# Patient Record
Sex: FEMALE | Race: WHITE | Employment: UNEMPLOYED | URBAN - METROPOLITAN AREA
[De-identification: names, ages, dates, MRNs, and addresses within clinical notes are randomized per-mention and may not be internally consistent; named-entity substitution may affect disease eponyms.]

---

## 2016-10-10 LAB
ANTIBODY SCREEN, EXTERNAL: NEGATIVE
CHLAMYDIA, EXTERNAL: NEGATIVE
GRBS, EXTERNAL: POSITIVE
HBSAG, EXTERNAL: NEGATIVE
HEPATITIS C AB,   EXT: NEGATIVE
HIV, EXTERNAL: NEGATIVE
N. GONORRHEA, EXTERNAL: NEGATIVE
RPR, EXTERNAL: NON REACTIVE
RUBELLA, EXTERNAL: NORMAL
TYPE, ABO & RH, EXTERNAL: NORMAL

## 2017-04-07 LAB — GRBS, EXTERNAL: POSITIVE

## 2017-05-01 ENCOUNTER — HOSPITAL ENCOUNTER (INPATIENT)
Age: 42
LOS: 1 days | Discharge: HOME | End: 2017-05-02
Attending: OBSTETRICS & GYNECOLOGY | Admitting: OBSTETRICS & GYNECOLOGY
Payer: SELF-PAY

## 2017-05-01 PROBLEM — O09.523 ELDERLY MULTIGRAVIDA IN THIRD TRIMESTER: Status: ACTIVE | Noted: 2017-05-01

## 2017-05-01 PROBLEM — K90.0 CELIAC DISEASE: Status: ACTIVE | Noted: 2017-05-01

## 2017-05-01 PROBLEM — E03.9 ACQUIRED HYPOTHYROIDISM: Status: ACTIVE | Noted: 2017-05-01

## 2017-05-01 LAB
ABO + RH BLD: NORMAL
BASOPHILS # BLD AUTO: 0 K/UL (ref 0–0.06)
BASOPHILS # BLD: 0 % (ref 0–2)
BLOOD GROUP ANTIBODIES SERPL: NORMAL
DIFFERENTIAL METHOD BLD: NORMAL
EOSINOPHIL # BLD: 0.1 K/UL (ref 0–0.4)
EOSINOPHIL NFR BLD: 1 % (ref 0–5)
ERYTHROCYTE [DISTWIDTH] IN BLOOD BY AUTOMATED COUNT: 13.7 % (ref 11.6–14.5)
HCT VFR BLD AUTO: 38.8 % (ref 35–45)
HGB BLD-MCNC: 13 G/DL (ref 12–16)
LYMPHOCYTES # BLD AUTO: 24 % (ref 21–52)
LYMPHOCYTES # BLD: 1.5 K/UL (ref 0.9–3.6)
MCH RBC QN AUTO: 30.8 PG (ref 24–34)
MCHC RBC AUTO-ENTMCNC: 33.5 G/DL (ref 31–37)
MCV RBC AUTO: 91.9 FL (ref 74–97)
MONOCYTES # BLD: 0.5 K/UL (ref 0.05–1.2)
MONOCYTES NFR BLD AUTO: 7 % (ref 3–10)
NEUTS SEG # BLD: 4.4 K/UL (ref 1.8–8)
NEUTS SEG NFR BLD AUTO: 68 % (ref 40–73)
PLATELET # BLD AUTO: 181 K/UL (ref 135–420)
PMV BLD AUTO: 11.2 FL (ref 9.2–11.8)
RBC # BLD AUTO: 4.22 M/UL (ref 4.2–5.3)
SPECIMEN EXP DATE BLD: NORMAL
WBC # BLD AUTO: 6.4 K/UL (ref 4.6–13.2)

## 2017-05-01 PROCEDURE — 74011250636 HC RX REV CODE- 250/636: Performed by: ADVANCED PRACTICE MIDWIFE

## 2017-05-01 PROCEDURE — 75410000014 HC MIDWIFREY DEL SVC

## 2017-05-01 PROCEDURE — 75410000000 HC DELIVERY VAGINAL/SINGLE

## 2017-05-01 PROCEDURE — 74011000258 HC RX REV CODE- 258: Performed by: ADVANCED PRACTICE MIDWIFE

## 2017-05-01 PROCEDURE — 86900 BLOOD TYPING SEROLOGIC ABO: CPT | Performed by: ADVANCED PRACTICE MIDWIFE

## 2017-05-01 PROCEDURE — 85025 COMPLETE CBC W/AUTO DIFF WBC: CPT | Performed by: ADVANCED PRACTICE MIDWIFE

## 2017-05-01 PROCEDURE — 75410000002 HC LABOR FEE PER 1 HR

## 2017-05-01 PROCEDURE — 65270000029 HC RM PRIVATE

## 2017-05-01 PROCEDURE — 75410000003 HC RECOV DEL/VAG/CSECN EA 0.5 HR

## 2017-05-01 PROCEDURE — 74011250637 HC RX REV CODE- 250/637: Performed by: ADVANCED PRACTICE MIDWIFE

## 2017-05-01 PROCEDURE — 77030020255 HC SOL INJ LR 1000ML BG

## 2017-05-01 RX ORDER — TERBUTALINE SULFATE 1 MG/ML
0.25 INJECTION SUBCUTANEOUS
Status: DISCONTINUED | OUTPATIENT
Start: 2017-05-01 | End: 2017-05-01 | Stop reason: HOSPADM

## 2017-05-01 RX ORDER — CITALOPRAM 20 MG/1
20 TABLET, FILM COATED ORAL DAILY
Status: CANCELLED | OUTPATIENT
Start: 2017-05-01

## 2017-05-01 RX ORDER — SENNOSIDES 8.6 MG/1
2 TABLET ORAL
Status: DISCONTINUED | OUTPATIENT
Start: 2017-05-01 | End: 2017-05-02 | Stop reason: HOSPADM

## 2017-05-01 RX ORDER — CITALOPRAM 20 MG/1
20 TABLET, FILM COATED ORAL DAILY
COMMUNITY

## 2017-05-01 RX ORDER — MISOPROSTOL 200 UG/1
200 TABLET ORAL
Status: COMPLETED | OUTPATIENT
Start: 2017-05-01 | End: 2017-05-01

## 2017-05-01 RX ORDER — MISOPROSTOL 200 UG/1
800 TABLET ORAL ONCE
Status: DISCONTINUED | OUTPATIENT
Start: 2017-05-01 | End: 2017-05-01 | Stop reason: HOSPADM

## 2017-05-01 RX ORDER — OXYTOCIN/RINGER'S LACTATE 20/1000 ML
1000 PLASTIC BAG, INJECTION (ML) INTRAVENOUS
Status: DISCONTINUED | OUTPATIENT
Start: 2017-05-01 | End: 2017-05-02 | Stop reason: HOSPADM

## 2017-05-01 RX ORDER — ACETAMINOPHEN 325 MG/1
325-650 TABLET ORAL
Status: DISCONTINUED | OUTPATIENT
Start: 2017-05-01 | End: 2017-05-02 | Stop reason: HOSPADM

## 2017-05-01 RX ORDER — OXYTOCIN/RINGER'S LACTATE 20/1000 ML
125 PLASTIC BAG, INJECTION (ML) INTRAVENOUS
Status: DISCONTINUED | OUTPATIENT
Start: 2017-05-01 | End: 2017-05-02 | Stop reason: HOSPADM

## 2017-05-01 RX ORDER — IBUPROFEN 600 MG/1
600 TABLET ORAL
Status: DISCONTINUED | OUTPATIENT
Start: 2017-05-01 | End: 2017-05-02 | Stop reason: HOSPADM

## 2017-05-01 RX ORDER — BUTORPHANOL TARTRATE 1 MG/ML
2 INJECTION INTRAMUSCULAR; INTRAVENOUS
Status: DISCONTINUED | OUTPATIENT
Start: 2017-05-01 | End: 2017-05-01 | Stop reason: HOSPADM

## 2017-05-01 RX ORDER — OXYTOCIN/RINGER'S LACTATE 20/1000 ML
PLASTIC BAG, INJECTION (ML) INTRAVENOUS
Status: DISPENSED
Start: 2017-05-01 | End: 2017-05-02

## 2017-05-01 RX ORDER — ACETAMINOPHEN 500 MG
TABLET ORAL 2 TIMES DAILY
COMMUNITY

## 2017-05-01 RX ORDER — MISOPROSTOL 200 UG/1
800 TABLET ORAL
Status: COMPLETED | OUTPATIENT
Start: 2017-05-01 | End: 2017-05-01

## 2017-05-01 RX ORDER — LIDOCAINE HYDROCHLORIDE 10 MG/ML
20 INJECTION, SOLUTION EPIDURAL; INFILTRATION; INTRACAUDAL; PERINEURAL AS NEEDED
Status: DISCONTINUED | OUTPATIENT
Start: 2017-05-01 | End: 2017-05-01 | Stop reason: HOSPADM

## 2017-05-01 RX ORDER — CASTOR OIL 100 %
30 OIL (ML) ORAL ONCE
Status: DISCONTINUED | OUTPATIENT
Start: 2017-05-01 | End: 2017-05-01 | Stop reason: HOSPADM

## 2017-05-01 RX ORDER — OXYTOCIN/RINGER'S LACTATE 20/1000 ML
125 PLASTIC BAG, INJECTION (ML) INTRAVENOUS CONTINUOUS
Status: DISCONTINUED | OUTPATIENT
Start: 2017-05-01 | End: 2017-05-01 | Stop reason: HOSPADM

## 2017-05-01 RX ORDER — OXYTOCIN 10 [USP'U]/ML
10 INJECTION, SOLUTION INTRAMUSCULAR; INTRAVENOUS
Status: DISCONTINUED | OUTPATIENT
Start: 2017-05-01 | End: 2017-05-02 | Stop reason: HOSPADM

## 2017-05-01 RX ORDER — LEVOTHYROXINE AND LIOTHYRONINE 19; 4.5 UG/1; UG/1
65 TABLET ORAL DAILY
Status: CANCELLED | OUTPATIENT
Start: 2017-05-01

## 2017-05-01 RX ORDER — OXYTOCIN/RINGER'S LACTATE 20/1000 ML
500 PLASTIC BAG, INJECTION (ML) INTRAVENOUS ONCE
Status: DISCONTINUED | OUTPATIENT
Start: 2017-05-01 | End: 2017-05-01 | Stop reason: HOSPADM

## 2017-05-01 RX ORDER — SODIUM CHLORIDE, SODIUM LACTATE, POTASSIUM CHLORIDE, CALCIUM CHLORIDE 600; 310; 30; 20 MG/100ML; MG/100ML; MG/100ML; MG/100ML
125 INJECTION, SOLUTION INTRAVENOUS CONTINUOUS
Status: DISCONTINUED | OUTPATIENT
Start: 2017-05-01 | End: 2017-05-01 | Stop reason: HOSPADM

## 2017-05-01 RX ORDER — HYDROCORTISONE 10 MG/G
CREAM TOPICAL AS NEEDED
Status: DISCONTINUED | OUTPATIENT
Start: 2017-05-01 | End: 2017-05-02 | Stop reason: HOSPADM

## 2017-05-01 RX ORDER — NALBUPHINE HYDROCHLORIDE 10 MG/ML
10 INJECTION, SOLUTION INTRAMUSCULAR; INTRAVENOUS; SUBCUTANEOUS
Status: DISCONTINUED | OUTPATIENT
Start: 2017-05-01 | End: 2017-05-01 | Stop reason: HOSPADM

## 2017-05-01 RX ORDER — HYDROMORPHONE HYDROCHLORIDE 1 MG/ML
1 INJECTION, SOLUTION INTRAMUSCULAR; INTRAVENOUS; SUBCUTANEOUS
Status: DISCONTINUED | OUTPATIENT
Start: 2017-05-01 | End: 2017-05-01 | Stop reason: HOSPADM

## 2017-05-01 RX ORDER — METHYLERGONOVINE MALEATE 0.2 MG/ML
0.2 INJECTION INTRAVENOUS AS NEEDED
Status: DISCONTINUED | OUTPATIENT
Start: 2017-05-01 | End: 2017-05-01 | Stop reason: HOSPADM

## 2017-05-01 RX ADMIN — MISOPROSTOL 800 MCG: 200 TABLET ORAL at 18:00

## 2017-05-01 RX ADMIN — MISOPROSTOL 200 MCG: 200 TABLET ORAL at 13:23

## 2017-05-01 RX ADMIN — Medication 125 ML/HR: at 19:09

## 2017-05-01 RX ADMIN — SODIUM CHLORIDE 5 MILLION UNITS: 900 INJECTION INTRAVENOUS at 05:58

## 2017-05-01 RX ADMIN — PENICILLIN G POTASSIUM 2.5 MILLION UNITS: 20000000 POWDER, FOR SOLUTION INTRAVENOUS at 10:18

## 2017-05-01 RX ADMIN — Medication 1000 ML/HR: at 18:00

## 2017-05-01 RX ADMIN — SODIUM CHLORIDE, SODIUM LACTATE, POTASSIUM CHLORIDE, AND CALCIUM CHLORIDE 125 ML/HR: 600; 310; 30; 20 INJECTION, SOLUTION INTRAVENOUS at 05:58

## 2017-05-01 RX ADMIN — PENICILLIN G POTASSIUM 2.5 MILLION UNITS: 20000000 POWDER, FOR SOLUTION INTRAVENOUS at 14:14

## 2017-05-01 NOTE — L&D DELIVERY NOTE
Delivery Summary    Patient: Andre Lopez MRN: [id number]  SSN: [id number]    YOB: 1975  Age: 39 y.o. Sex: female        Labor Events:    Labor: No    Rupture Date: 2017    Rupture Time: 4:00 AM    Rupture Type      Amniotic Fluid Volume: Moderate     Amniotic Fluid Description: Clear  None    Induction:           Augmentation:      Labor Complications: None     Additional Complications:        Cervical Ripenin2017  1:23 PM  Misoprostol      Delivery Events:  Episiotomy: None    Laceration(s): None       Repaired: None     Number of Repair Packets:      Suture Type and Size: None        Estimated Blood Loss (ml): 400      Karen moved from 4 to 5 cms very quickly and requested [address]. Transferred to  [id number]. Spontaneous urge to push and requested standing birth. Rapid descent for a controlled spontaneous vaginal delivery of a viable male infant with a snug double nuchal cord sommersaulted to birth and cord reduced at level of Karen's mons. Tactile stimulation produced a cry but tone remained poor and cry did not continue. Cord clamped for cut by dad and baby to the warmer for assistance. Dr Fer Fairchild in just after one minute apgar. Gases collected by RN: unable to obtain arterial sample. Venous collected. Dr Monroe Age aware. Cord blood collected. Karen to the foot of the bed for birth of placenta. Placenta delivered spontaneously and intact via Schultze mechanism. Pitocin started via IV. Heavy bleeding noted and 800 mcg of cytotec added per rectum along with continued fundal massage. Bleeding stable. Perineum and vagina intact to inspection. Lacey Broderick assisted to the head of the bed. Baby to her arms. Dyad skin to skin in the birthing room with family gathered around. Dr Mukund Solis notified of the birth.   Sebas Lubin CNM    Information for the patient's :  Epifanio Samano [[id number]]     Delivery Summary - Baby    Delivery Date: 2017   Delivery Time: 5:50 PM   Delivery Type: Vaginal, Spontaneous Delivery  Sex:  male  Gestational Age: 43w3d  Delivery Clinician:  Merlene [facility]?: Yes   Delivery Location: [place] [address]  One minute Five minutes Ten minutes   Skin Color: 0    1       Heart Rate: 2   2         Reflex Irritability: 2   2         Muscle Tone: 0   2       Respiration: 1   2         Total: 5   9           Presentation: Vertex  Position: Middle Occiput Anterior  Resuscitation Method:        Meconium Stained:      Cord Information: 3 Vessels   Complications: Nuchal Cord Without Compressions  Cord Blood Sent?:  Yes    Blood Gases Sent?:  Yes    Placenta:  Date/Time:   5:56 PM  Removal: Spontaneous      Appearance:        Measurements:  Birth Weight:      Birth Length:     Head Circumference:       Chest Circumference:      Abdominal Girth:       Other Providers:   CHANO Seals Cleve Banks, MARK Brandon Gores D Primary Nurse;Primary  Nurse;Midwife;Pediatrician;Staff Nurse;Staff Nurse           Cord Blood Results:  Information for the patient's :  Ramone Wilkerson [[id number]]   No results found for: ABORH, PCTABR, PCTDIG, BILI, ABORHEXT, ABORH    Information for the patient's :  Ramone Wilkerson [[id number]]   No results found for: APH, APCO2, APO2, AHCO3, ABEC, ABDC, O2ST, SITE, RSCOM, PHI, PCO2I, PO2I, HCO3I, SO2I, IBD     Information for the patient's :  Ramone Wilkerson [[id number]]   No results found for: EPHV, PCO2V, PO2V, HCO3V, O2STV, EBDV

## 2017-05-01 NOTE — IP AVS SNAPSHOT
Current Discharge Medication List  
  
CONTINUE these medications which have NOT CHANGED Dose & Instructions Dispensing Information Comments Morning Noon Evening Bedtime Cholecalciferol (Vitamin D3) 2,000 unit Cap capsule Commonly known as:  VITAMIN D3 Your last dose was: Your next dose is: Take  by mouth two (2) times a day. Refills:  0  
     
   
   
   
  
 citalopram 20 mg tablet Commonly known as:  Claudeen Allegra Your last dose was: Your next dose is:    
   
   
 Dose:  20 mg Take 20 mg by mouth daily. Refills:  0 IRON PO Your last dose was: Your next dose is: Take  by mouth. Refills:  0 MAGNESIUM PO Your last dose was: Your next dose is: Take  by mouth. Refills:  0 PRENATAL PO Your last dose was: Your next dose is: Take  by mouth. Refills:  0  
     
   
   
   
  
 thyroid (Pork) 32.5 mg tablet Commonly known as:  NATURE-THROID/WESTHROID Your last dose was: Your next dose is:    
   
   
 Dose:  65 mg Take 65 mg by mouth daily. Refills:  0 STOP taking these medications   
 sertraline 100 mg tablet Commonly known as:  ZOLOFT  
   
  
  
ASK your doctor about these medications Dose & Instructions Dispensing Information Comments Morning Noon Evening Bedtime OTHER(NON-FORMULARY) Your last dose was: Your next dose is:    
   
   
  Refills:  0 POTASSIUM PO Your last dose was: Your next dose is: Take  by mouth. Refills:  0

## 2017-05-01 NOTE — PROGRESS NOTES
Labor Progress Note  Douglass Litten is still noting fatigue. She prefers to proceed with cervical ripening. We have discussed oral cytotec and discuss it again. She would like to proceed now rather than await spontaneous labor. SVE deferred as is not toña. PROM around 4 am today. Pelvic exam:       Cervical Exam  Dilation (cm):  (1)  Eff:  (40)  Station: Floating  Presentation: Cephalic (verified by ultrasound)  FHR Interpretation: Overall pattern reassuring  FHTs reactive from 135  UCs occasional and mild    Visit Vitals    /72    Pulse 83    Temp 98.2 °F (36.8 °C)    Resp 18    Ht 5' 1\" (1.549 m)    Wt 74.8 kg (165 lb)    SpO2 92%    Breastfeeding Yes    BMI 31.18 kg/m2       Temp (24hrs), Av °F (36.7 °C), Min:97.8 °F (36.6 °C), Max:98.2 °F (36.8 °C)      Recent Labs      17   0552   WBC  6.4   HGB  13.0           Assessment/Plan:  Orders as written. Dr Bay Cords here and updated.      Titus Bill CNM  2017  12:53 PM

## 2017-05-01 NOTE — PROGRESS NOTES
Labor Progress Note  Yumiko Herrera is huhgry, a bit fatigued and very comfortable. Her  and family members are at her bedside. Georges Rucker ruptured just after 4 and has only a few contractions with she describes as no different than the ones she had yesterday. We discussed her options for the day. We discussed the nonreactive FHTs. She ate yesterday at noon, not much last night and nothing this morning. We will begin with a breakfast tray. Baby is high so I encourage monitoring until baby is well applied to the cervix. She agrees. Waiting, SOL, oral cytotec all discussed as options. Medicated vs unmedicated birth options also discussed with no pressure to know at this time what she will prefer. [address] if 5 cms/0 station and wanting unmedicated birth. Expectant management for now. Discussed antibiotic prophylaxis for GBS. Georges Rucker would like ONLY two doses. Discussed the protocol, risks and benefits and ok the use of her lactinex so she is feeling better about the plan for prophylaxis. Pelvic exam:       Cervical Exam  Dilation (cm):  (1)  Eff:  (40)  Station: Floating  Presentation: Cephalic (verified by ultrasound)  FHR Interpretation: Overall pattern reassuring  Category 1  No labor contractions at this time. Visit Vitals    /67    Pulse 80    Temp 97.8 °F (36.6 °C)    Resp 18    Ht 5' 1\" (1.549 m)    Wt 74.8 kg (165 lb)    SpO2 92%    Breastfeeding Yes    BMI 31.18 kg/m2       Temp (24hrs), Av °F (36.7 °C), Min:97.8 °F (36.6 °C), Max:98.2 °F (36.8 °C)      Recent Labs      17   0552   WBC  6.4   HGB  13.0           Assessment: Grand multip with PROM, GBS pos. Presenting part very high. Plan: Breakfast tray now. Antibiotic prophylaxis to continue. Expectant management with continuous EFM for now.    Mary Diaz CNM  2017  9:58 AM

## 2017-05-01 NOTE — PROGRESS NOTES
Patient transferred via wheelchair from labor and delivery to midwifery center for delivery with family following, carrying belongings. Patient tolerated transfer well.  Fetal heart tones obtained upon arrival.

## 2017-05-01 NOTE — IP AVS SNAPSHOT
Beatris Malloy 
 
 
 [address] 
[phone] Patient: Yola Mccoy MRN: [id number] You are allergic to the following Allergen Reactions Barley Other (comments) Gluten Other (comments) Celiac disease with multiple system reactions Rye Grass Other (comments) Wheat Other (comments) Recent Documentation Height Weight Breastfeeding? BMI OB Status Smoking Status 1.549 m 74.8 kg Yes 31.18 kg/m2 Recent pregnancy Never Smoker Emergency Contacts Name Discharge Info Relation Home Work Mobile Irwin Herrera  Spouse [3] [phone] About your hospitalization You were admitted on:  May 1, 2017 You last received care in the:  [facility] You were discharged on:  May 2, 2017 Unit phone number:  [phone] Why you were hospitalized Your primary diagnosis was:  Postpartum Care Following Vaginal Delivery Your diagnoses also included:  Labor And Delivery Indication For Care Or Intervention, Elderly Multigravida In Third Trimester, Depression, Acquired Hypothyroidism, [address] Multiparity With Current Pregnancy In Labor And Delivery In Third Trimester, Celiac Disease Providers Seen During Your Hospitalizations Provider Role Specialty Primary office phone Pamela Cotter MD Attending Provider Obstetrics & Gynecology [phone] Your Primary Care Physician (PCP) Primary Care Physician Office Phone Office Fax Mariya Hernandez [phone] Follow-up Information Follow up With Details Comments Contact Info Jericho Nails MD Schedule an appointment as soon as possible for a visit to arrange for thyroid evaluation [address]akila Ortiz [address] [id number] [phone] [address] , [facility] In 6 weeks  [address] [facility], [address] [place] [address]) [phone] Current Discharge Medication List  
  
CONTINUE these medications which have NOT CHANGED Dose & Instructions Dispensing Information Comments Morning Noon Evening Bedtime Cholecalciferol (Vitamin D3) 2,000 unit Cap capsule Commonly known as:  VITAMIN D3 Your last dose was: Your next dose is: Take  by mouth two (2) times a day. Refills:  0  
     
   
   
   
  
 citalopram 20 mg tablet Commonly known as:  Abhishek Saul Your last dose was: Your next dose is:    
   
   
 Dose:  20 mg Take 20 mg by mouth daily. Refills:  0 IRON PO Your last dose was: Your next dose is: Take  by mouth. Refills:  0 MAGNESIUM PO Your last dose was: Your next dose is: Take  by mouth. Refills:  0 PRENATAL PO Your last dose was: Your next dose is: Take  by mouth. Refills:  0  
     
   
   
   
  
 thyroid (Pork) 32.5 mg tablet Commonly known as:  NATURE-THROID/WESTHROID Your last dose was: Your next dose is:    
   
   
 Dose:  65 mg Take 65 mg by mouth daily. Refills:  0 STOP taking these medications   
 sertraline 100 mg tablet Commonly known as:  ZOLOFT  
   
  
  
ASK your doctor about these medications Dose & Instructions Dispensing Information Comments Morning Noon Evening Bedtime OTHER(NON-FORMULARY) Your last dose was: Your next dose is:    
   
   
  Refills:  0 POTASSIUM PO Your last dose was: Your next dose is: Take  by mouth. Refills:  0 Discharge Instructions CONGRATULATIONS ON THE BIRTH OF YOUR BABY!  
The first six weeks after childbirth is a time of physical and emotional adjustment. This handout will help to answer questions and provide guidance during the postpartum period. Every family's adjustment is unique, so please call if you have further concerns. At anytime we can be reached at [phone]. During office hours please ask to speak to a charge nurse. After hours, the answering service will take a message and the Nurse-Midwife on-call will return your call. If your question can wait until office hours: Monday-Friday 8:30-4:00, please do so. For emergencies or urgent concerns do not hesitate to call us after hours. DIET Your body is in need of a well-balanced, high protein diet to recuperate from birth. Please continue to take your prenatal vitamins for 6 weeks or as long as you are breastfeeding. Continue to drink at least 6-8 cups of water or other liquid a day. A breastfeeding mother also needs extra protein, calories and calcium containing foods. It is a good rule to drink fluids with every feeding in order to maintain an adequate milk supply and avoid dehydration. Your baby will probably not be bothered by things in your diet, but if the baby seems extremely fussy or develops a rash, you may want to discuss possible food intolerances with your baby's care provider. PAIN MEDICATIONS Acetaminophen (Tylenol), ibuprofen (Motrin), or other prescribed pain medication may be taken as directed to relieve discomfort. The above medications pass in very minimal amounts into the breast milk and usually will not cause problems. There are medications that may affect the baby, so please consult your baby's care provider before taking medication. If you are breastfeeding, be sure to mention this to any care provider you see so that medications that are safe may be selected. There is an excellent resource called RAMÓN that is a resource for medication safety in pregnancy and lactation.   You can visit their website at [email or web address] or call them toll free at [phone] if you have any questions about medication safety. UTERINE INVOLUTION / VAGINAL BLEEDING Involution is the process of the uterus returning to pre-pregnant size. It will take approximately six weeks for this process to occur. To achieve this size your uterus becomes firm to slow bleeding loss from the placental site. The first 7 days after birth, the bleeding is red and heavy. It may change with your activity and position. Some small clots are normal.   After ten days, the bleeding should be pale pink and slowed considerably. The next several weeks may progress to a pink, mucousy discharge. This may continue for 6-8 weeks, depending on your activity. During the first four weeks after delivery we recommend using sanitary pads instead of tampons. Douching should also be avoided, but it is fine to take a tub bath so long as the tub is very clean. ACTIVITY/EXERCISE Adequate rest is essential to recovery. Try to rest or sleep when the baby sleeps. After two weeks, you may begin going for short walks, doing Kegel exercises and abdominal crunches. Avoid heavy, jarring or aerobic exercises. Remember to start out slowly and build up to your previous fitness level. Use common sense and don't overdo as rest is important and the benefits of increased rest are a quicker recovery. For the first two weeks after a  try to limit trips up or down steps. Do not lift anything heavier than the baby during this time. Lifting the baby or other objects should be done by bending at the knees rather than the waist.  Driving should be avoided during the first two to three weeks until you have the strength to push firmly on the brakes in case of an emergency. You may ride as a passenger, but DO wear a seat belt at all times.   
 
After a few weeks, you may resume normal activity at whatever pace is comfortable for you. Exercise may also be resumed gradually. Walking is a good way to start. Finally, try to be reasonable in your expectations. Caring for a new baby after major surgery can be quite trying. Arrange for assistance at home to ensure that you get enough rest.  
 
POSTPARTUM CHECK You may call the office when you return home to set up a postpartum visit. Most patients will be seen at 6 weeks after delivery, but after a  or other circumstances you may be seen in 2 weeks or less. If you are discharged from the hospital with staples that must be removed, you will be asked to come in sooner. At your postpartum visit, a pelvic exam may be performed. If you are having any problems or concerns, please do not hesitate to call. Once again our number is [phone]. MOOD CHANGES Significant hormonal changes occur in the days following delivery, and as a result, many women experience brief episodes of tearfulness or feeling \"blue. \"  These emotional swings may be made worse by lack of sleep and by the adjustments inherent in becoming a mother. For some women, these fluctuations are minor. For others, they are overwhelming; creating feelings of anxiety, depression, or the inability to cope. If you have difficulty functioning as a result of feeling down, or if the mood changes seem severe, do not improve, or result is thoughts of harming yourself or others CALL RIGHT AWAY. PERINEAL CARE The basic goals of perineal care are to prevent infection, to relieve pain and promote healing. Your stitches will dissolve in four to six weeks, and do not need to be removed. After urinating, please continue to clean with warm water from front to back. Please continue sitz baths as instructed twice a day for a week or as needed. Call the office if you see pus in the suture site, or have unusual or severe swelling or pain that seems to be getting worse.   
 
 
 
 
 
INCISION CARE 
 If you had a , clean and dry the incision gently as you would the rest of your body. Washing over the area with soap and water, and showering are fine. If steri-strips are present they will gradually come off with time. Tub baths are permitted. You may experience numbness and burning in the area surrounding the incision which usually resolves gradually over the next several weeks or months. RETURN OF MENSTRUATION Your first menstrual period may occur as soon as four to six weeks after your delivery if you are not breast-feeding. If breast-feeding it is more difficult to predict when your first period will occur. Even if you are not yet menstruating, you may be ovulating and it may be possible to conceive again. It is common for your first period after childbirth to be very heavy with an increased amount of cramping. BREASTS Breast-feeding Mothers: Colostrum is excreted in the first 24-72 hours. Mature breast milk will appear on the 2nd to 5th day. Engorgement may occur with the mature milk making your breasts feel warm and very full. Frequent feedings will make you more comfortable. Babies do not nurse on regular schedules. Nursing every 1 1/2 to 2 hours is normal and frequent feeding DOES NOT mean you are not making enough milk. To avoid nipple confusion, do not give bottles for the first 4 weeks. Growth spurts are common and may require more frequent feedings. This is the way baby increases your milk supply. During a growth spurt, you may feel you are feeding very frequently and that your breasts are \"empty. \"  Don't worry, your milk is produced by supply and demand so this increased frequency of feeding will increase your milk supply within 48 hours. Sore nipples may occur with frequent feedings and are sometimes also caused by improper latch. Check for a proper latch. Baby should have a wide open mouth. Use different positions at each feeding if possible.   Express a small amount of colostrum or breast milk onto the sore area and leave bra flaps unlatched until dry. The lactation consultant at [facility] is available for outpatient consultation without charge. Call [phone] from Monday-Friday 9:00am- 3:00pm to arrange an outpatient appointment with her. Local [facility] and consultants may also be very helpful. If You Are Not Breast-feeding: You will experience swelling, engorgement and some milk production. There are no safe medications available to stop lactation. Some remedies for engorgement include: wearing a tight bra, ice packs and cold green cabbage leaves placed between the breast and your bra. Change these frequently. Tylenol or Motrin should help with the discomfort. SEXUAL ADJUSTMENTS We recommend that you wait at least four weeks before resuming sexual intercourse. A sore perineum, a demanding baby and fatigue will certainly affect your ability to enjoy lovemaking! A vaginal lubricant is recommended to help with any dryness. It is very important to remember that you will ovulate BEFORE your first period and can conceive. If you do not wish another pregnancy right away, please take precautions to avoid pregnancy. If you would like a prescription method of birth control, please discuss this with us at your 6 week visit. ELIMINATION We remind all postpartum patients that it may take a few days for your bowels to return to normal, especially if you had a long labor. For those who had C-sections or severe lacerations, we recommend that you use a stool softener twice daily for at least two weeks. Many stool softeners are over-the-counter. Colace (Docusate Sodium) is recommended. Bulk forming agents such as Metamucil or Fibercon may be used daily in addition to a stool softener to promote regular bowel movements. Eating fresh fruits and vegetables along with whole grains is helpful as well.   Do not be afraid to have a bowel movement as your stitches will not \"come out\" in the course of having a bowel movement. Urination may be difficult due to soreness around the urethra, or as an after effect of epidural.  This is temporary and can be helped  by squirting water over the perineum or try going in the shower. Hemorrhoids are common after birth. Tucks pads, Anusol cream and avoiding constipation are helpful. If constipation does occur, you may take Milk of Magnesia or Senekot according to the package instructions. DANGER SIGNS! CALL WITHOUT DELAY IF YOU ARE EXPERIENCING ANY OF THE FOLLOWING: 
* Unusually heavy bleeding, soaking more than 1 or more pads in an hour. * Vaginal discharge with strong foul odor. * Fever of 101 or higher * Unusual pain or tenderness in the abdominal area. * If breasts are red, hot or have a painful lump. * Depression that persists longer than 1-2 weeks or is severe. * Any urinary frequency accompanied by urgency or pain. * A lump in leg or calf especially if painful, warm or red. We thank you for choosing us for your prenatal care and/or delivery. We wish you all happiness and health with your baby for his or her lifetime! Patient armband removed and shredded Discharge Instructions Attachments/References DEPRESSION: POSTPARTUM (ENGLISH) Discharge Orders None [place] Announcement We are excited to announce that we are making your provider's discharge notes available to you in [employer]. You will see these notes when they are completed and signed by the physician that discharged you from your recent hospital stay. If you have any questions or concerns about any information you see in [employer], please call the Health Information Department where you were seen or reach out to your Primary Care Provider for more information about your plan of care. Introducing [facility] [place] [facility]! Cristino Rutledge introduces [employer] patient portal. Now you can access parts of your medical record, email your doctor's office, and request medication refills online. 1. In your internet browser, go to [email or web address] 2. Click on the First Time User? Click Here link in the Sign In box. You will see the New Member Sign Up page. 3. Enter your [employer] Access Code exactly as it appears below. You will not need to use this code after youve completed the sign-up process. If you do not sign up before the expiration date, you must request a new code. · [employer] Access Code: [email or web address] Expires: 7/20/2017  2:46 PM 
 
4. Enter the last four digits of your Social Security Number (xxxx) and Date of Birth (mm/dd/yyyy) as indicated and click Submit. You will be taken to the next sign-up page. 5. Create a [employer] ID. This will be your [employer] login ID and cannot be changed, so think of one that is secure and easy to remember. 6. Create a [employer] password. You can change your password at any time. 7. Enter your Password Reset Question and Answer. This can be used at a later time if you forget your password. 8. Enter your e-mail address. You will receive e-mail notification when new information is available in [address] 9. Click Sign Up. You can now view and download portions of your medical record. 10. Click the Download Summary menu link to download a portable copy of your medical information. If you have questions, please visit the Frequently Asked Questions section of the [employer] website. Remember, [employer] is NOT to be used for urgent needs. For medical emergencies, dial 911. Now available from your iPhone and Android! General Information Please provide this summary of care documentation to your next provider. Patient Signature:  ____________________________________________________________ Date:  ____________________________________________________________  
  
Mahnaz Crooks Provider Signature:  ____________________________________________________________ Date:  ____________________________________________________________ More Information Depression After Childbirth: Care Instructions Your Care Instructions Many women get the \"baby blues\" during the first few days after childbirth. You may lose sleep, feel irritable, and cry easily. You may feel happy one minute and sad the next. Hormone changes are one cause of these emotional changes. Also, the demands of a new baby, along with visits from relatives or other family needs, add to a mother's stress. The \"baby blues\" often peak around the fourth day. Then they ease up in less than 2 weeks. If your moodiness or anxiety lasts for more than 2 weeks, or if you feel like life is not worth living, you may have postpartum depression. This is different for each mother. Some mothers with serious depression may worry intensely about their infant's well-being. Others may feel distant from their child. Some mothers might even feel that they might harm their baby. A mother may have signs of paranoia, wondering if someone is watching her. Depression is not a sign of weakness. It is a medical condition that requires treatment. Medicine and counseling often work well to reduce depression. Talk to your doctor about taking antidepressant medicine while breastfeeding. Follow-up care is a key part of your treatment and safety. Be sure to make and go to all appointments, and call your doctor if you are having problems. It's also a good idea to know your test results and keep a list of the medicines you take. How do you know if you are depressed? With all the changes in your life, you may not know if you are depressed. Pregnancy sometimes causes changes in how you feel that are similar to the symptoms of depression. Symptoms of depression include: · Feeling sad or hopeless and losing interest in daily activities. These are the most common symptoms of depression. · Sleeping too much or not enough. · Feeling tired. You may feel as if you have no energy. · Eating too much or too little. · Writing or talking about death, such as writing suicide notes or talking about guns, knives, or pills. Keep the numbers for these national suicide hotlines: 3-931-627-TALK (0-542.149.5846) and 7-454-WRIOJNZ (1-439.971.4221). If you or someone you know talks about suicide or feeling hopeless, get help right away. How can you care for yourself at home? · Be safe with medicines. Take your medicines exactly as prescribed. Call your doctor if you think you are having a problem with your medicine. · Eat a healthy diet so that you can keep up your energy. · Get regular daily exercise, such as walks, to help improve your mood. · Get as much sunlight as possible. Keep your shades and curtains open. Get outside as much as you can. · Avoid using alcohol or other substances to feel better. · Get as much rest and sleep as possible. Avoid doing too much. Being too tired can increase depression. · Play stimulating music throughout your day and soothing music at night. · Schedule outings and visits with friends and family. Ask them to call you regularly, so that you do not feel alone. · Ask for help with preparing food and other daily tasks. Family and friends are often happy to help a mother with a . · Be honest with yourself and those who care about you. Tell them about your struggle. · Join a support group of new mothers. No one can better understand the challenges of caring for a  than other new mothers. · If you feel like life is not worth living or are feeling hopeless, get help right away. Keep the numbers for these national suicide hotlines: 7-201-098-TALK (4-694.755.5063) and 8-172-BOZZYTC (4-350.526.5410). When should you call for help? Call 911 anytime you think you may need emergency care. For example, call if: 
· You feel you cannot stop from hurting yourself, your baby, or someone else. Call your doctor now or seek immediate medical care if: 
· You are having trouble caring for yourself or your baby. · You hear voices. Watch closely for changes in your health, and be sure to contact your doctor if: 
· You have problems with your depression medicine. · You do not get better as expected. Where can you learn more? Go to http://jah-milo.info/. Enter N857 in the search box to learn more about \"Depression After Childbirth: Care Instructions. \" Current as of: July 26, 2016 Content Version: 11.2 © 3953-9831 [employer]. Care instructions adapted under license by [employer] (which disclaims liability or warranty for this information). If you have questions about a medical condition or this instruction, always ask your healthcare professional. [address] any warranty or liability for your use of this information.

## 2017-05-01 NOTE — ROUTINE PROCESS
Verbal shift change report given to Wilbert Gross, RN and Caryl Kennedy RN (oncoming nurse) by Shakila Terrell RN (offgoing nurse). Report included the following information SBAR.

## 2017-05-01 NOTE — PROGRESS NOTES
Patient resting in bed, visiting with family. Denies any pain at this time. Reviewed plan of care, including antibiotic administration and expectations for fetal monitoring, with patient and . Both able to verbalize understanding. Reviewed delivery plans with patient at this time. Patient denies any further needs at this time.

## 2017-05-01 NOTE — H&P
Obstetric Admission History and Physical    Name: Scot Bowers MRN: [id number] SSN: [id number]    YOB: 1975  Age: [address] y.o. Sex: female       Subjective:      Chief complaint:    Chief Complaint   Patient presents with    Rupture of Lesta Calero is a [address] y.o.  female, G 10 P 8 who presents at 39.4 weeks gestation with edc 5/4. On admission EFM strip is obtained and is Category 1. She reports onset of symptoms at 0430 of SROM of clear fluid, no contractions. OB HISTORY  Prenatal care started at 10 wks with [facility] . TVS supporting dates and viability. Problems of pregnancy include AMA, declined genetic testing. ; hypothyroid- on naturethyroid daily; depression taking celexa with good control ; Total wt gain 23 lbs. GBS pos.     PAST PREGNANCY HISTORY   M  [facility] unmed   F   homebirth   F  homebirth   [facility]    F 9 lb 12 oz home   F  home   F  home   M  home  2014 F 39 wks 8 hrs 7 lb 11 oz  [facility] unmed    PAST MEDICAL / SURGICAL HISTORY  Past Medical History:   Diagnosis Date    Advanced maternal age in multigravida     Depression     GBS carrier     Hypothyroid in pregnancy, antepartum     Migraine     Rubella non-immune status, antepartum     Vitamin D deficiency      Problem List as of 2017  Date Reviewed: 2017          Codes Class Noted - Resolved    * (Principal)Labor and delivery indication for care or intervention ICD-10-CM: O75.9  ICD-9-CM: 659.90  2017 - Present        Elderly multigravida in third trimester ICD-10-CM: O09.523  ICD-9-CM: 659.63  2017 - Present        Acquired hypothyroidism ICD-10-CM: E03.9  ICD-9-CM: 244.9  2017 - Present        Grand multiparity with current pregnancy in labor and delivery in third trimester ICD-10-CM: O09.43  ICD-9-CM: 659.43  2017 - Present        Depression (Chronic) ICD-10-CM: F32.9  ICD-9-CM: 311  Unknown - Present              FAMILY/ SOCIAL HISTORY  Social History     Social History    Marital status:      Spouse name: N/A    Number of children: N/A    Years of education: N/A     Occupational History    Not on file. Social History Main Topics    Smoking status: Never Smoker    Smokeless tobacco: Never Used    Alcohol use No    Drug use: No    Sexual activity: Yes     Other Topics Concern    Not on file     Social History Narrative    6/17/2015:  [employer]. , she and her  have 8 children, most born at home with midwives, but last child born at [facility] 8/11/14. Thinking this will be last pregnancy due to concerns about overall health. ALLERGY:  No Known Allergies    Review of Systems:  A comprehensive review of systems was negative      Objective:     VITAL SIGNS:  Visit Vitals    Ht 5' 1\" (1.549 m)    Wt 74.8 kg (165 lb)    BMI 31.18 kg/m2       Physical Exam:  Abdomen: soft  Position of baby vtx; verified by ultrasound  Estimated fetal weight 8 lbs 8 oz  Contractions q irregular  FHR baseline at  130 bpm/ variability mod/Category 1/accels  External Genitalia: normal general appearance without lesions  Cervix: Dilation: 1cm/th/-3  Membranes  SROM at 0430 clear    Assessment:     1) 39.4 weeks Intrauterine Pregnancy . 2) PROM/ GBS pos; PCN IV      Plan:     Reassuring fetal status, Continue plan for vaginal delivery   Early labor with PROM; abx infusing with GBS pos.  will move to [facility] with active labor.      Dr Denisse Carvalho MD  notified and aware of admission    Signed By:  Madeleine Sharma CNM     May 1, 2017

## 2017-05-01 NOTE — PROGRESS NOTES
Labor Progress Note  Ina Martin is working hard with contractions now. Better on her feet and using wireless EFM. Reassuring FHTs but somewhat hard to trace due to the steep angle of Karen's gravid belly.  is so helpful to her, right at her side. Karen's mother and daughter also present. 2 doses of oral cytotec. Pelvic exam:       Cervical Exam  Dilation (cm): 4  Eff: 80 %  Station: -1  Baby Position: Vertex  Presentation: Cephalic (verified by ultrasound)  FHR Interpretation: Overall pattern reassuring  FHTs 140s  UCs a 2-3    Visit Vitals    /88    Pulse 82    Temp 98.2 °F (36.8 °C)    Resp 18    Ht 5' 1\" (1.549 m)    Wt 74.8 kg (165 lb)    SpO2 92%    Breastfeeding Yes    BMI 31.18 kg/m2       Temp (24hrs), Av.1 °F (36.7 °C), Min:97.8 °F (36.6 °C), Max:98.2 °F (36.8 °C)      Recent Labs      17   0552   WBC  6.4   HGB  13.0           Assessment/Plan:  Continue plan for vaginal delivery   Dr Rocky Sanders here and updated.      Emery Vallecillo CNM  2017  5:18 PM

## 2017-05-02 VITALS
BODY MASS INDEX: 31.15 KG/M2 | HEIGHT: 61 IN | OXYGEN SATURATION: 100 % | SYSTOLIC BLOOD PRESSURE: 128 MMHG | RESPIRATION RATE: 18 BRPM | TEMPERATURE: 97.7 F | HEART RATE: 70 BPM | DIASTOLIC BLOOD PRESSURE: 85 MMHG | WEIGHT: 165 LBS

## 2017-05-02 PROBLEM — O09.523 ELDERLY MULTIGRAVIDA IN THIRD TRIMESTER: Status: RESOLVED | Noted: 2017-05-01 | Resolved: 2017-05-02

## 2017-05-02 LAB
HCT VFR BLD AUTO: 34.7 % (ref 35–45)
HGB BLD-MCNC: 11.3 G/DL (ref 12–16)

## 2017-05-02 PROCEDURE — 36415 COLL VENOUS BLD VENIPUNCTURE: CPT | Performed by: ADVANCED PRACTICE MIDWIFE

## 2017-05-02 PROCEDURE — 85018 HEMOGLOBIN: CPT | Performed by: ADVANCED PRACTICE MIDWIFE

## 2017-05-02 NOTE — PROGRESS NOTES
TRANSFER - IN REPORT:    Verbal report received from L&D(name) on [address]  being received from Brittney Starks RN(unit) for routine progression of care      Report consisted of patients Situation, Background, Assessment and   Recommendations(SBAR). Information from the following report(s) SBAR, Kardex, Procedure Summary, Intake/Output, MAR, Recent Results and Med Rec Status was reviewed with the receiving nurse. Opportunity for questions and clarification was provided. Assessment completed upon patients arrival to unit and care assumed. Noted blank blood spot/child ID card, asked parents if they would like sample to be obtained when PKU is obtained, mother declines.

## 2017-05-02 NOTE — PROGRESS NOTES
Baby announcement.     [address] [facility]   Staff [address]   [phone]

## 2017-05-02 NOTE — PROGRESS NOTES
1930 - Assumed care of patient. Patient in room sitting up in bed seemed comfortable. Spouse and family at bedside. Introduced myself. Updated whiteboard. Explained plan of care for the shift. Completed head to toe assessment and fundal check. Salvador pad changed to assess any increased bleeding. Assessment is uneventful. Patient denies pain or any other concerns will continue to monitor per protocol. 2000 - Patient prefers to delay salvador care until after family leaves her bedside. Fundal check within normal limits. 2130 - Patient up to ambulate to bathroom. Patient voided X1. Vital signs stable. Fundus firm at umbilicus small to moderate lochia. Patient denies pain. Room stripped for continuous post-partum care in the midwifery room. 2211 - TRANSFER - OUT REPORT:    Verbal report given to CHANDAN Acevedo RN(name) on Douglass Litten  being transferred to Midwifery center postpartum care(unit) for routine progression of care       Report consisted of patients Situation, Background, Assessment and   Recommendations(SBAR). Information from the following report(s) SBAR, Procedure Summary, Intake/Output and Recent Results was reviewed with the receiving nurse.     Lines:   Peripheral IV 05/01/17 Right Arm (Active)   Site Assessment Clean, dry, & intact 5/1/2017 10:40 PM   Phlebitis Assessment 0 5/1/2017 10:40 PM   Infiltration Assessment 0 5/1/2017 10:40 PM   Dressing Status Clean, dry, & intact 5/1/2017 10:40 PM   Dressing Type Transparent 5/1/2017 10:40 PM   Hub Color/Line Status Pink;capped  5/1/2017 10:40 PM   Alcohol Cap Used Yes 5/1/2017 10:40 PM        Opportunity for questions

## 2017-05-02 NOTE — DISCHARGE SUMMARY
Obstetrical Discharge Summary     Name: Keith Lynch MRN: [id number]  SSN: [id number]    YOB: 1975  Age: 39 y.o. Sex: female      Admit Date: 2017    Discharge Date: 2017     Admitting Physician: Kalyan Suggs MD     Attending Physician:  Rm Marinelli MD     * Admission Diagnoses: maternity  Labor and delivery indication for care or intervention    * Discharge Diagnoses:   Information for the patient's :  Monica Wesley [[id number]]   Delivery of a 4.015 kg male infant via Vaginal, Spontaneous Delivery on 2017 at 5:50 PM  by . Apgars were 5 and 9. Additional Diagnoses:   Hospital Problems as of 2017  Date Reviewed: 2017          Codes Class Noted - Resolved POA    * (Principal)Postpartum care following vaginal delivery ICD-10-CM: Z39.2  ICD-9-CM: V24.2  2017 - Present Unknown        Acquired hypothyroidism ICD-10-CM: E03.9  ICD-9-CM: 244.9  2017 - Present Yes        Celiac disease ICD-10-CM: K90.0  ICD-9-CM: 579.0  2017 - Present Yes        Depression (Chronic) ICD-10-CM: F32.9  ICD-9-CM: 412  Unknown - Present Yes        RESOLVED: Labor and delivery indication for care or intervention ICD-10-CM: O75.9  ICD-9-CM: 659.90  2017 - 2017 Unknown        RESOLVED: Elderly multigravida in third trimester ICD-10-CM: O09.523  ICD-9-CM: 659.63  2017 - 2017 Yes        RESOLVED: [address] multiparity with current pregnancy in labor and delivery in third trimester ICD-10-CM: O09.43  ICD-9-CM: 659.43  2017 - 2017 Yes             Lab Results   Component Value Date/Time    ABO/Rh(D) O POSITIVE 2017 05:52 AM    Rubella, External non-immune 10/10/2016    GrBStrep, External positive 2017    ABO,Rh O+ 10/10/2016    There is no immunization history for the selected administration types on file for this patient.     * Procedures:          * Discharge Condition: stable    * Hospital Course: Normal hospital course following the delivery. * Disposition: Home    Discharge Medications:   Current Discharge Medication List      CONTINUE these medications which have NOT CHANGED    Details   citalopram (CELEXA) 20 mg tablet Take 20 mg by mouth daily. FERROUS SULFATE (IRON PO) Take  by mouth. MAGNESIUM PO Take  by mouth. thyroid, Pork, (NATURE-THROID/WESTHROID) 32.5 mg tablet Take 65 mg by mouth daily. PNV GR.82/KEUBFXH FUM/FOLIC AC (PRENATAL PO) Take  by mouth. Cholecalciferol, Vitamin D3, (VITAMIN D3) 2,000 unit cap capsule Take  by mouth two (2) times a day. STOP taking these medications       sertraline (ZOLOFT) 100 mg tablet Comments:   Reason for Stopping:               * Follow-up Care/Patient Instructions:   Activity: Activity as tolerated, No sex for 6 weeks and No heavy lifting for 6 weeks  Diet: Regular Diet    Follow-up Information     Follow up With Details Comments Contact Info    Kelly Cordoba MD Schedule an appointment as soon as possible for a visit to arrange for thyroid evaluation Lorenza Rome at [address] [place] [address], [facility] In 6 weeks  [address] [facility]           Signed By:  Allie Hull CNM     May 2, 2017

## 2017-05-02 NOTE — ROUTINE PROCESS
Verbal shift change report given to Lay Amaral RN (oncoming nurse) by Anni Pugh RN (offgoing nurse). Report included the following information Kardex, Intake/Output, MAR and Recent Results. 0830--assumed care--patient is breast feeding--denies needs--will return for assessment. 1050--assessment done--has showered--voiding without difficulty--denies weakness when up--having some uterine cramping when breast feeding, but denies need for pain medication--reports breast feeding is going well--has varicosities on both legs--one swollen vessel on left thigh is uncomfortable, otherwise no discomfort--po fluids encouraged. 1740--pending the baby's discharge testing results, discharge planned for this PM--POC for discharge discussed--verbalizes understanding--remains comfortable. 1850--discharge ongoing--teaching will be done after shift change--parents aware and verbalize understanding.

## 2017-05-02 NOTE — PROGRESS NOTES
Progress Note    Patient: Doris Coleman MRN: [id number]     YOB: 1975  Age: 39 y.o. Subjective:     Postpartum Day: 1    The patient is feeling well. Pain is  well controlled with current medications. Urinary output is adequate. Baby is feeding via breast without difficulty. Tender superficial vessel lateral aspect of left thigh. Wants to go home today    Objective:      Patient Vitals for the past 12 hrs:   Temp Pulse Resp BP SpO2   05/02/17 1050 97.6 °F (36.4 °C) 72 18 118/85 98 %   05/02/17 0230 98.2 °F (36.8 °C) 70 14 112/61 98 %       General:    alert   Lochia:  appropriate   Uterine Fundus:   firm @ umbilicus    Perineum:  Intact    DVT Evaluation:  No evidence of DVT seen on physical exam; superficial vessel soft     Lab/Data Review:  Recent Results (from the past 24 hour(s))   HGB & HCT    Collection Time: 05/02/17  8:00 AM   Result Value Ref Range    HGB 11.3 (L) 12.0 - 16.0 g/dL    HCT 34.7 (L) 35.0 - 45.0 %     All lab results for the last 24 hours reviewed. Assessment:     Delivery: spontaneous vaginal delivery    Plan:     Doing well postpartum vaginal delivery. Daxa Prasad is an experienced mother that has good resources for f/u in the community, is acceptable for discharge today. Infant's plan dependent upon pediatrician's evaluation. Instructions given as we plan DC home today. Follow-up in the office in 6 weeks. Call prn. Current Discharge Medication List      CONTINUE these medications which have NOT CHANGED    Details   citalopram (CELEXA) 20 mg tablet Take 20 mg by mouth daily. FERROUS SULFATE (IRON PO) Take  by mouth. MAGNESIUM PO Take  by mouth. thyroid, Pork, (NATURE-THROID/WESTHROID) 32.5 mg tablet Take 65 mg by mouth daily. PNV PE.68/NHJXTTW FUM/FOLIC AC (PRENATAL PO) Take  by mouth. Cholecalciferol, Vitamin D3, (VITAMIN D3) 2,000 unit cap capsule Take  by mouth two (2) times a day.          STOP taking these medications sertraline (ZOLOFT) 100 mg tablet Comments:   Reason for Stopping:               Signed By: Yamini Parker CNM     May 2, 2017

## 2017-05-02 NOTE — LACTATION NOTE
Mom states baby is latching and nursing well. Very experienced mom, no questions. Info sheet and daily log given, encouraged to call as needed.

## 2017-05-02 NOTE — DISCHARGE INSTRUCTIONS

## 2017-05-03 NOTE — ROUTINE PROCESS
Pt in room, holding Baby waiting on discharge. [de-identified] bracelet checked against Moms and Foot print sheet. Hugs Bracelet cut off with foot bracelet, foot bracelet taped to foot sheet. Discharge instructions given for baby and Mom. No specific questions  Were asked. Pt comfortable going home. 1930 Pt discharged  To car with Baby into car seat ,Fob with pt All in stable condition.